# Patient Record
Sex: FEMALE | Race: WHITE | NOT HISPANIC OR LATINO | Employment: OTHER | URBAN - METROPOLITAN AREA
[De-identification: names, ages, dates, MRNs, and addresses within clinical notes are randomized per-mention and may not be internally consistent; named-entity substitution may affect disease eponyms.]

---

## 2023-01-01 ENCOUNTER — NURSING HOME VISIT (OUTPATIENT)
Dept: POST ACUTE CARE | Facility: EXTERNAL LOCATION | Age: 63
End: 2023-01-01
Payer: MEDICARE

## 2023-01-01 ENCOUNTER — PATIENT OUTREACH (OUTPATIENT)
Dept: PRIMARY CARE | Facility: CLINIC | Age: 63
End: 2023-01-01
Payer: MEDICARE

## 2023-01-01 ENCOUNTER — APPOINTMENT (OUTPATIENT)
Dept: PRIMARY CARE | Facility: CLINIC | Age: 63
End: 2023-01-01
Payer: MEDICARE

## 2023-01-01 ENCOUNTER — OFFICE VISIT (OUTPATIENT)
Dept: PRIMARY CARE | Facility: CLINIC | Age: 63
End: 2023-01-01
Payer: MEDICARE

## 2023-01-01 VITALS
SYSTOLIC BLOOD PRESSURE: 131 MMHG | OXYGEN SATURATION: 96 % | TEMPERATURE: 97 F | WEIGHT: 206 LBS | HEART RATE: 71 BPM | BODY MASS INDEX: 36.49 KG/M2 | DIASTOLIC BLOOD PRESSURE: 82 MMHG | RESPIRATION RATE: 16 BRPM

## 2023-01-01 VITALS
DIASTOLIC BLOOD PRESSURE: 74 MMHG | HEART RATE: 84 BPM | SYSTOLIC BLOOD PRESSURE: 130 MMHG | OXYGEN SATURATION: 96 % | WEIGHT: 199 LBS | RESPIRATION RATE: 20 BRPM | BODY MASS INDEX: 35.25 KG/M2 | TEMPERATURE: 98 F

## 2023-01-01 VITALS — HEART RATE: 100 BPM | DIASTOLIC BLOOD PRESSURE: 72 MMHG | OXYGEN SATURATION: 95 % | SYSTOLIC BLOOD PRESSURE: 111 MMHG

## 2023-01-01 VITALS
WEIGHT: 200 LBS | DIASTOLIC BLOOD PRESSURE: 81 MMHG | OXYGEN SATURATION: 94 % | BODY MASS INDEX: 35.43 KG/M2 | TEMPERATURE: 98.2 F | RESPIRATION RATE: 19 BRPM | HEART RATE: 70 BPM | SYSTOLIC BLOOD PRESSURE: 128 MMHG

## 2023-01-01 VITALS
WEIGHT: 207 LBS | HEART RATE: 91 BPM | DIASTOLIC BLOOD PRESSURE: 76 MMHG | SYSTOLIC BLOOD PRESSURE: 138 MMHG | OXYGEN SATURATION: 96 % | RESPIRATION RATE: 16 BRPM | TEMPERATURE: 98.6 F | BODY MASS INDEX: 36.67 KG/M2

## 2023-01-01 VITALS
TEMPERATURE: 98.2 F | SYSTOLIC BLOOD PRESSURE: 130 MMHG | DIASTOLIC BLOOD PRESSURE: 81 MMHG | WEIGHT: 206 LBS | OXYGEN SATURATION: 96 % | HEART RATE: 89 BPM | RESPIRATION RATE: 20 BRPM | BODY MASS INDEX: 36.49 KG/M2

## 2023-01-01 VITALS
RESPIRATION RATE: 16 BRPM | OXYGEN SATURATION: 96 % | BODY MASS INDEX: 33.13 KG/M2 | DIASTOLIC BLOOD PRESSURE: 74 MMHG | HEART RATE: 76 BPM | WEIGHT: 187 LBS | SYSTOLIC BLOOD PRESSURE: 120 MMHG | TEMPERATURE: 98.3 F

## 2023-01-01 VITALS
BODY MASS INDEX: 35.61 KG/M2 | WEIGHT: 201 LBS | RESPIRATION RATE: 19 BRPM | TEMPERATURE: 98 F | SYSTOLIC BLOOD PRESSURE: 130 MMHG | OXYGEN SATURATION: 96 % | DIASTOLIC BLOOD PRESSURE: 72 MMHG | HEART RATE: 90 BPM

## 2023-01-01 DIAGNOSIS — R53.81 PHYSICAL DECONDITIONING: ICD-10-CM

## 2023-01-01 DIAGNOSIS — I50.9 HEART FAILURE, UNSPECIFIED HF CHRONICITY, UNSPECIFIED HEART FAILURE TYPE (MULTI): Primary | ICD-10-CM

## 2023-01-01 DIAGNOSIS — E11.65 TYPE 2 DIABETES MELLITUS WITH HYPERGLYCEMIA, WITH LONG-TERM CURRENT USE OF INSULIN (MULTI): Primary | ICD-10-CM

## 2023-01-01 DIAGNOSIS — L30.4 INTERTRIGO: ICD-10-CM

## 2023-01-01 DIAGNOSIS — G89.4 CHRONIC PAIN SYNDROME: ICD-10-CM

## 2023-01-01 DIAGNOSIS — G47.33 OSA (OBSTRUCTIVE SLEEP APNEA): ICD-10-CM

## 2023-01-01 DIAGNOSIS — K21.9 GASTROESOPHAGEAL REFLUX DISEASE, UNSPECIFIED WHETHER ESOPHAGITIS PRESENT: ICD-10-CM

## 2023-01-01 DIAGNOSIS — J44.9 CHRONIC OBSTRUCTIVE PULMONARY DISEASE, UNSPECIFIED COPD TYPE (MULTI): ICD-10-CM

## 2023-01-01 DIAGNOSIS — E87.6 HYPOKALEMIA: ICD-10-CM

## 2023-01-01 DIAGNOSIS — D22.9 SKIN MOLE: ICD-10-CM

## 2023-01-01 DIAGNOSIS — D22.9 MULTIPLE NEVI: ICD-10-CM

## 2023-01-01 DIAGNOSIS — D80.3 IGG DEFICIENCY (MULTI): ICD-10-CM

## 2023-01-01 DIAGNOSIS — E11.65 TYPE 2 DIABETES MELLITUS WITH HYPERGLYCEMIA, WITH LONG-TERM CURRENT USE OF INSULIN (MULTI): ICD-10-CM

## 2023-01-01 DIAGNOSIS — D50.9 IRON DEFICIENCY ANEMIA, UNSPECIFIED IRON DEFICIENCY ANEMIA TYPE: ICD-10-CM

## 2023-01-01 DIAGNOSIS — Z00.00 HEALTH CARE MAINTENANCE: ICD-10-CM

## 2023-01-01 DIAGNOSIS — F33.9 RECURRENT MAJOR DEPRESSIVE DISORDER, REMISSION STATUS UNSPECIFIED (CMS-HCC): ICD-10-CM

## 2023-01-01 DIAGNOSIS — J18.9 RECURRENT PNEUMONIA: ICD-10-CM

## 2023-01-01 DIAGNOSIS — F41.9 ANXIETY SYNDROME: ICD-10-CM

## 2023-01-01 DIAGNOSIS — D47.3 ESSENTIAL THROMBOCYTHEMIA (MULTI): ICD-10-CM

## 2023-01-01 DIAGNOSIS — F32.A DEPRESSIVE DISORDER: ICD-10-CM

## 2023-01-01 DIAGNOSIS — I82.622 DEEP VEIN THROMBOSIS (DVT) OF LEFT UPPER EXTREMITY, UNSPECIFIED CHRONICITY, UNSPECIFIED VEIN (MULTI): ICD-10-CM

## 2023-01-01 DIAGNOSIS — Z79.4 TYPE 2 DIABETES MELLITUS WITH HYPERGLYCEMIA, WITH LONG-TERM CURRENT USE OF INSULIN (MULTI): Primary | ICD-10-CM

## 2023-01-01 DIAGNOSIS — K59.00 CONSTIPATION, UNSPECIFIED CONSTIPATION TYPE: ICD-10-CM

## 2023-01-01 DIAGNOSIS — I50.9 HEART FAILURE, UNSPECIFIED HF CHRONICITY, UNSPECIFIED HEART FAILURE TYPE (MULTI): ICD-10-CM

## 2023-01-01 DIAGNOSIS — E55.9 VITAMIN D DEFICIENCY: ICD-10-CM

## 2023-01-01 DIAGNOSIS — E66.01 CLASS 2 SEVERE OBESITY WITH SERIOUS COMORBIDITY AND BODY MASS INDEX (BMI) OF 35.0 TO 35.9 IN ADULT, UNSPECIFIED OBESITY TYPE (MULTI): ICD-10-CM

## 2023-01-01 DIAGNOSIS — J18.9 PNEUMONIA DUE TO INFECTIOUS ORGANISM, UNSPECIFIED LATERALITY, UNSPECIFIED PART OF LUNG: ICD-10-CM

## 2023-01-01 DIAGNOSIS — R05.9 COUGH, UNSPECIFIED TYPE: ICD-10-CM

## 2023-01-01 DIAGNOSIS — Z79.4 TYPE 2 DIABETES MELLITUS WITH HYPERGLYCEMIA, WITH LONG-TERM CURRENT USE OF INSULIN (MULTI): ICD-10-CM

## 2023-01-01 DIAGNOSIS — Z00.00 ENCOUNTER FOR GENERAL ADULT MEDICAL EXAMINATION WITHOUT ABNORMAL FINDINGS: ICD-10-CM

## 2023-01-01 DIAGNOSIS — F41.9 ANXIETY DISORDER, UNSPECIFIED: ICD-10-CM

## 2023-01-01 PROCEDURE — 99308 SBSQ NF CARE LOW MDM 20: CPT | Performed by: NURSE PRACTITIONER

## 2023-01-01 PROCEDURE — 3074F SYST BP LT 130 MM HG: CPT | Performed by: INTERNAL MEDICINE

## 2023-01-01 PROCEDURE — 99309 SBSQ NF CARE MODERATE MDM 30: CPT | Performed by: NURSE PRACTITIONER

## 2023-01-01 PROCEDURE — 3078F DIAST BP <80 MM HG: CPT | Performed by: INTERNAL MEDICINE

## 2023-01-01 PROCEDURE — 99214 OFFICE O/P EST MOD 30 MIN: CPT | Performed by: INTERNAL MEDICINE

## 2023-01-01 PROCEDURE — 3066F NEPHROPATHY DOC TX: CPT | Performed by: INTERNAL MEDICINE

## 2023-01-01 PROCEDURE — 3051F HG A1C>EQUAL 7.0%<8.0%: CPT | Performed by: INTERNAL MEDICINE

## 2023-01-01 PROCEDURE — 3008F BODY MASS INDEX DOCD: CPT | Performed by: INTERNAL MEDICINE

## 2023-01-01 PROCEDURE — 1036F TOBACCO NON-USER: CPT | Performed by: INTERNAL MEDICINE

## 2023-01-01 RX ORDER — FLUTICASONE FUROATE, UMECLIDINIUM BROMIDE AND VILANTEROL TRIFENATATE 200; 62.5; 25 UG/1; UG/1; UG/1
1 POWDER RESPIRATORY (INHALATION) DAILY
COMMUNITY

## 2023-01-01 RX ORDER — PRIMIDONE 50 MG/1
50 TABLET ORAL NIGHTLY
COMMUNITY
End: 2023-01-01

## 2023-01-01 RX ORDER — HYDROCODONE BITARTRATE AND ACETAMINOPHEN 5; 325 MG/1; MG/1
1 TABLET ORAL EVERY 6 HOURS PRN
COMMUNITY

## 2023-01-01 RX ORDER — FLUTICASONE FUROATE, UMECLIDINIUM BROMIDE AND VILANTEROL TRIFENATATE 100; 62.5; 25 UG/1; UG/1; UG/1
1 POWDER RESPIRATORY (INHALATION) DAILY
COMMUNITY
End: 2023-01-01 | Stop reason: ALTCHOICE

## 2023-01-01 RX ORDER — BUSPIRONE HYDROCHLORIDE 15 MG/1
1 TABLET ORAL 3 TIMES DAILY
COMMUNITY
Start: 2021-11-09

## 2023-01-01 RX ORDER — LANCETS
EACH MISCELLANEOUS DAILY
COMMUNITY

## 2023-01-01 RX ORDER — PANTOPRAZOLE SODIUM 40 MG/1
1 TABLET, DELAYED RELEASE ORAL DAILY
COMMUNITY
Start: 2021-08-06 | End: 2023-01-01

## 2023-01-01 RX ORDER — LANCETS 26 GAUGE
1 EACH MISCELLANEOUS DAILY
COMMUNITY

## 2023-01-01 RX ORDER — DOCUSATE SODIUM 100 MG/1
1 CAPSULE, LIQUID FILLED ORAL DAILY
COMMUNITY
Start: 2022-03-23

## 2023-01-01 RX ORDER — PREDNISONE 20 MG/1
20 TABLET ORAL DAILY
COMMUNITY
End: 2023-01-01 | Stop reason: SDUPTHER

## 2023-01-01 RX ORDER — HYDROXYZINE HYDROCHLORIDE 25 MG/1
50 TABLET, FILM COATED ORAL EVERY 8 HOURS PRN
Qty: 90 TABLET | Refills: 0 | Status: SHIPPED | OUTPATIENT
Start: 2023-01-01 | End: 2023-10-04

## 2023-01-01 RX ORDER — ALBUTEROL SULFATE 0.83 MG/ML
1 SOLUTION RESPIRATORY (INHALATION) EVERY 4 HOURS PRN
COMMUNITY
Start: 2021-08-18

## 2023-01-01 RX ORDER — IPRATROPIUM BROMIDE AND ALBUTEROL SULFATE 2.5; .5 MG/3ML; MG/3ML
3 SOLUTION RESPIRATORY (INHALATION)
COMMUNITY
Start: 2021-08-18

## 2023-01-01 RX ORDER — ROFLUMILAST 250 UG/1
250 TABLET ORAL EVERY OTHER DAY
COMMUNITY
Start: 2021-08-18

## 2023-01-01 RX ORDER — PREDNISONE 10 MG/1
TABLET ORAL
Qty: 90 TABLET | Refills: 3 | Status: SHIPPED | OUTPATIENT
Start: 2023-01-01 | End: 2024-06-06

## 2023-01-01 RX ORDER — HYDROXYZINE HYDROCHLORIDE 25 MG/1
25 TABLET, FILM COATED ORAL EVERY 8 HOURS PRN
COMMUNITY
End: 2023-01-01 | Stop reason: SDUPTHER

## 2023-01-01 RX ORDER — FERROUS GLUCONATE 324(38)MG
1 TABLET ORAL
COMMUNITY
Start: 2022-03-21

## 2023-01-01 RX ORDER — GUAIFENESIN 600 MG/1
600 TABLET, EXTENDED RELEASE ORAL 2 TIMES DAILY
COMMUNITY
Start: 2021-08-18

## 2023-01-01 RX ORDER — BLOOD SUGAR DIAGNOSTIC
STRIP MISCELLANEOUS DAILY
COMMUNITY
End: 2023-01-01

## 2023-01-01 RX ORDER — ONDANSETRON 4 MG/1
4 TABLET, FILM COATED ORAL EVERY 8 HOURS PRN
COMMUNITY
Start: 2021-10-12

## 2023-01-01 RX ORDER — HYDROXYZINE HYDROCHLORIDE 25 MG/1
50 TABLET, FILM COATED ORAL EVERY 8 HOURS PRN
Qty: 90 TABLET | Refills: 1 | Status: SHIPPED | OUTPATIENT
Start: 2023-01-01 | End: 2023-01-01

## 2023-01-01 RX ORDER — PREDNISOLONE 5 MG/1
5 TABLET ORAL DAILY
COMMUNITY
End: 2023-01-01 | Stop reason: DRUGHIGH

## 2023-01-01 RX ORDER — FUROSEMIDE 40 MG/1
1 TABLET ORAL DAILY
COMMUNITY
Start: 2021-08-18 | End: 2023-01-01

## 2023-01-01 RX ORDER — QUETIAPINE FUMARATE 25 MG/1
TABLET, FILM COATED ORAL 3 TIMES DAILY
COMMUNITY
End: 2023-01-01

## 2023-01-01 RX ORDER — GLIPIZIDE 5 MG/1
1 TABLET ORAL 2 TIMES DAILY
COMMUNITY
Start: 2021-08-18 | End: 2023-01-01

## 2023-01-01 RX ORDER — DEXTROSE 4 G
TABLET,CHEWABLE ORAL DAILY
COMMUNITY

## 2023-01-01 RX ORDER — FUROSEMIDE 20 MG/1
20 TABLET ORAL DAILY
COMMUNITY
End: 2023-01-01

## 2023-01-01 RX ORDER — NYSTATIN 100000 [USP'U]/G
1 POWDER TOPICAL
COMMUNITY

## 2023-01-01 RX ORDER — CALCITONIN SALMON 200 [IU]/.09ML
1 SPRAY, METERED NASAL DAILY
COMMUNITY
Start: 2021-08-18

## 2023-01-01 RX ORDER — MIRTAZAPINE 15 MG/1
1 TABLET, FILM COATED ORAL DAILY
COMMUNITY
Start: 2021-08-18 | End: 2023-01-01

## 2023-01-01 RX ORDER — NALOXONE HYDROCHLORIDE 0.4 MG/ML
INJECTION, SOLUTION INTRAMUSCULAR; INTRAVENOUS; SUBCUTANEOUS
COMMUNITY
Start: 2021-10-12

## 2023-01-01 RX ORDER — FERROUS GLUCONATE 324(38)MG
1 TABLET ORAL 2 TIMES DAILY
Qty: 180 TABLET | Refills: 0 | Status: SHIPPED | OUTPATIENT
Start: 2023-01-01 | End: 2023-01-01

## 2023-01-01 RX ORDER — ASCORBIC ACID 500 MG
500 TABLET ORAL DAILY
COMMUNITY

## 2023-01-01 RX ORDER — POTASSIUM CHLORIDE 750 MG/1
1 TABLET, FILM COATED, EXTENDED RELEASE ORAL DAILY
COMMUNITY
Start: 2022-08-26 | End: 2023-01-01 | Stop reason: ALTCHOICE

## 2023-01-01 RX ORDER — QUETIAPINE FUMARATE 25 MG/1
TABLET, FILM COATED ORAL
Qty: 60 TABLET | Refills: 0 | Status: SHIPPED | OUTPATIENT
Start: 2023-01-01 | End: 2023-01-01

## 2023-01-01 RX ORDER — ACETAMINOPHEN 500 MG
1 TABLET ORAL DAILY
COMMUNITY
Start: 2022-07-12 | End: 2023-01-01 | Stop reason: ALTCHOICE

## 2023-01-01 RX ORDER — POLYETHYLENE GLYCOL 3350 17 G/17G
17 POWDER, FOR SOLUTION ORAL DAILY
COMMUNITY

## 2023-01-01 RX ORDER — ESCITALOPRAM OXALATE 10 MG/1
1 TABLET ORAL DAILY
COMMUNITY
Start: 2021-03-08

## 2023-01-01 RX ORDER — POTASSIUM CHLORIDE 20 MEQ/1
20 TABLET, EXTENDED RELEASE ORAL DAILY
COMMUNITY

## 2023-01-01 RX ORDER — QUETIAPINE FUMARATE 25 MG/1
25 TABLET, FILM COATED ORAL NIGHTLY
COMMUNITY

## 2023-01-01 ASSESSMENT — ENCOUNTER SYMPTOMS
WHEEZING: 1
SHORTNESS OF BREATH: 1
COUGH: 1
WEAKNESS: 1
NERVOUS/ANXIOUS: 1
FATIGUE: 1

## 2023-01-01 ASSESSMENT — PAIN SCALES - GENERAL: PAINLEVEL: 0-NO PAIN

## 2023-03-04 PROBLEM — R21 RASH: Status: ACTIVE | Noted: 2023-01-01

## 2023-03-04 PROBLEM — G89.29 NECK PAIN, CHRONIC: Status: ACTIVE | Noted: 2023-01-01

## 2023-03-04 PROBLEM — R60.9 EDEMA: Status: ACTIVE | Noted: 2023-01-01

## 2023-03-04 PROBLEM — R52 GENERALIZED PAIN: Status: ACTIVE | Noted: 2023-01-01

## 2023-03-04 PROBLEM — I50.9 ACUTE ON CHRONIC CONGESTIVE HEART FAILURE (MULTI): Status: ACTIVE | Noted: 2023-01-01

## 2023-03-04 PROBLEM — J96.11 CHRONIC RESPIRATORY FAILURE WITH HYPOXIA (MULTI): Status: ACTIVE | Noted: 2023-01-01

## 2023-03-04 PROBLEM — F32.A DEPRESSIVE DISORDER: Status: ACTIVE | Noted: 2023-01-01

## 2023-03-04 PROBLEM — M79.672 BILATERAL LEG AND FOOT PAIN: Status: ACTIVE | Noted: 2023-01-01

## 2023-03-04 PROBLEM — M19.90 DJD (DEGENERATIVE JOINT DISEASE): Status: ACTIVE | Noted: 2023-01-01

## 2023-03-04 PROBLEM — M79.605 BILATERAL LEG AND FOOT PAIN: Status: ACTIVE | Noted: 2023-01-01

## 2023-03-04 PROBLEM — E66.01 CLASS 2 SEVERE OBESITY WITH SERIOUS COMORBIDITY AND BODY MASS INDEX (BMI) OF 35.0 TO 35.9 IN ADULT (MULTI): Status: ACTIVE | Noted: 2023-01-01

## 2023-03-04 PROBLEM — G89.4 CHRONIC PAIN SYNDROME: Status: ACTIVE | Noted: 2023-01-01

## 2023-03-04 PROBLEM — J44.1 COPD EXACERBATION (MULTI): Status: ACTIVE | Noted: 2023-01-01

## 2023-03-04 PROBLEM — D22.9 MULTIPLE NEVI: Status: ACTIVE | Noted: 2023-01-01

## 2023-03-04 PROBLEM — B59: Status: ACTIVE | Noted: 2023-01-01

## 2023-03-04 PROBLEM — I25.10 CAD (CORONARY ATHEROSCLEROTIC DISEASE): Status: ACTIVE | Noted: 2023-01-01

## 2023-03-04 PROBLEM — G47.33 OSA (OBSTRUCTIVE SLEEP APNEA): Status: ACTIVE | Noted: 2023-01-01

## 2023-03-04 PROBLEM — F11.90 CHRONIC NARCOTIC USE: Status: ACTIVE | Noted: 2023-01-01

## 2023-03-04 PROBLEM — M43.06 LUMBAR SPONDYLOLYSIS: Status: ACTIVE | Noted: 2023-01-01

## 2023-03-04 PROBLEM — R07.9 CHEST PAIN: Status: ACTIVE | Noted: 2023-01-01

## 2023-03-04 PROBLEM — R11.0 NAUSEA IN ADULT: Status: ACTIVE | Noted: 2023-01-01

## 2023-03-04 PROBLEM — J44.9 COPD (CHRONIC OBSTRUCTIVE PULMONARY DISEASE) (MULTI): Status: ACTIVE | Noted: 2023-01-01

## 2023-03-04 PROBLEM — D47.3 ESSENTIAL THROMBOCYTHEMIA (MULTI): Status: ACTIVE | Noted: 2023-01-01

## 2023-03-04 PROBLEM — J18.9 PNEUMONIA: Status: ACTIVE | Noted: 2023-01-01

## 2023-03-04 PROBLEM — F41.9 ANXIETY SYNDROME: Status: ACTIVE | Noted: 2023-01-01

## 2023-03-04 PROBLEM — E66.9 CLASS 2 OBESITY WITH BODY MASS INDEX (BMI) OF 37.0 TO 37.9 IN ADULT: Status: ACTIVE | Noted: 2023-01-01

## 2023-03-04 PROBLEM — R53.1 WEAKNESS: Status: ACTIVE | Noted: 2023-01-01

## 2023-03-04 PROBLEM — R91.1 LUNG NODULE, SOLITARY: Status: ACTIVE | Noted: 2023-01-01

## 2023-03-04 PROBLEM — R05.9 COUGH: Status: ACTIVE | Noted: 2023-01-01

## 2023-03-04 PROBLEM — R30.0 DYSURIA: Status: ACTIVE | Noted: 2023-01-01

## 2023-03-04 PROBLEM — M79.671 BILATERAL LEG AND FOOT PAIN: Status: ACTIVE | Noted: 2023-01-01

## 2023-03-04 PROBLEM — R06.00 DYSPNEA: Status: ACTIVE | Noted: 2023-01-01

## 2023-03-04 PROBLEM — M43.9 COMPRESSION DEFORMITY OF VERTEBRA: Status: ACTIVE | Noted: 2023-01-01

## 2023-03-04 PROBLEM — I21.4 NSTEMI, INITIAL EPISODE OF CARE (MULTI): Status: ACTIVE | Noted: 2023-01-01

## 2023-03-04 PROBLEM — R63.0 POOR APPETITE: Status: ACTIVE | Noted: 2023-01-01

## 2023-03-04 PROBLEM — D22.9 SKIN MOLE: Status: ACTIVE | Noted: 2023-01-01

## 2023-03-04 PROBLEM — F32.9 MDD (MAJOR DEPRESSIVE DISORDER): Status: ACTIVE | Noted: 2023-01-01

## 2023-03-04 PROBLEM — D75.839 THROMBOCYTOSIS: Status: ACTIVE | Noted: 2023-01-01

## 2023-03-04 PROBLEM — F17.210 CIGARETTE NICOTINE DEPENDENCE WITHOUT COMPLICATION: Status: ACTIVE | Noted: 2023-01-01

## 2023-03-04 PROBLEM — M54.2 NECK PAIN, CHRONIC: Status: ACTIVE | Noted: 2023-01-01

## 2023-03-04 PROBLEM — K59.00 CONSTIPATION: Status: ACTIVE | Noted: 2023-01-01

## 2023-03-04 PROBLEM — B37.0 ORAL THRUSH: Status: ACTIVE | Noted: 2023-01-01

## 2023-03-04 PROBLEM — K21.9 GERD (GASTROESOPHAGEAL REFLUX DISEASE): Status: ACTIVE | Noted: 2023-01-01

## 2023-03-04 PROBLEM — E11.9 DM2 (DIABETES MELLITUS, TYPE 2) (MULTI): Status: ACTIVE | Noted: 2023-01-01

## 2023-03-04 PROBLEM — J96.20: Status: ACTIVE | Noted: 2023-01-01

## 2023-03-04 PROBLEM — M79.604 BILATERAL LEG AND FOOT PAIN: Status: ACTIVE | Noted: 2023-01-01

## 2023-05-25 NOTE — PROGRESS NOTES
Discharge Facility: [facility]   Discharge Diagnosis:  Final Discharge Diagnoses: Stage 3 chronic kidney disease, COPD exacerbation, Acute on Chronic Diastolic Heart Failure     Admission Date 5-17-23  Discharge Date: 5-24-23    PCP Appointment Date: 6-2-23  Specialist Appointment Date: Pt. To follow up with Dr. Sellers in 2 weeks.   Hospital Encounter and Summary: Linked   See discharge assessment below for further details  Engagement  Call Start Time: 1044 (5/25/2023 10:46 AM)    Medications  Medications reviewed with patient/caregiver?: Yes (5/25/2023 10:46 AM)  Is the patient having any side effects they believe may be caused by any medication additions or changes?: No (5/25/2023 10:46 AM)  Does the patient have all medications ordered at discharge?: Yes (5/25/2023 10:46 AM)  Care Management Interventions: No intervention needed (5/25/2023 10:46 AM)  Is the patient taking all medications as directed (includes completed medication regime)?: Yes (5/25/2023 10:46 AM)  Care Management Interventions: Provided patient education (5/25/2023 10:46 AM)    Appointments  Does the patient have a primary care provider?: Yes (5/25/2023 10:46 AM)  Care Management Interventions: Verified appointment date/time/provider (5/25/2023 10:46 AM)  Has the patient kept scheduled appointments due by today?: Yes (5/25/2023 10:46 AM)  Care Management Interventions: Advised patient to keep appointment (5/25/2023 10:46 AM)    Self Management  What is the home health agency?: Home Care Certification:           Home Care Agency:   Other (with phone number)   [facility] - [phone]          Skilled Disciplines Ordered:   RN/LPN,  PT,  Home Health Aide    Home Care Services:           Home Care Skilled Service:   assessment,  CHF carepath,  COPD carepath,  Rehab (PT/OT/SP eval and treat) (5/25/2023 10:46 AM)  Has home health visited the patient within 72 hours of discharge?: Yes (5/25/2023 10:46 AM)    Patient Teaching  Does the  patient have access to their discharge instructions?: Yes (5/25/2023 10:46 AM)  Care Management Interventions: Reviewed instructions with patient (5/25/2023 10:46 AM)  What is the patient's perception of their health status since discharge?: Same (5/25/2023 10:46 AM)  Is the patient/caregiver able to teach back the hierarchy of who to call/visit for symptoms/problems? PCP, Specialist, Home Health nurse, Urgent Care, ED, 911: Yes (5/25/2023 10:46 AM)      Diamond Jiang LPN

## 2023-06-13 NOTE — PROGRESS NOTES
This  nurse did TCM Call with pt. Requesting for ambulance to be sent to her home for services. Pt. States her ankles are in a great deal of pain, and she feels more weak than she did yesterday. This nurse called the [facility] on the [place] fire de[facility]. To request services to her home.

## 2023-07-25 NOTE — LETTER
Patient: Ruth Escobedo  : 1960    Encounter Date: 2023    Patient ID: Ruth Escobedo is a 62 y.o. female who is acute skilled care being seen and evaluated for multiple medical problems.  [id number]   1960    /81   Pulse 89   Temp 36.8 °C (98.2 °F)   Resp 20   Wt 93.4 kg (206 lb)   SpO2 96%   BMI 36.49 kg/m²     Assessment/Plan  Problem List Items Addressed This Visit       Heart failure (CMS/[facility])     Increase Lasix to 60 mg by mouth daily x 5 days then decrease to 40 mg by mouth daily         Anxiety syndrome     Vistaril  25 mg by mouth BID as needed   Buspirone 15 mg by mouth TID         COPD (chronic obstructive pulmonary disease) (CMS/[facility])     O2 4 lit NC  Prednisone  taper as prescribed   Duoneb every 4 hrs and every two hrs as needed   Budesonide 0.5 ml inhalation BID   Trelegy 100-62.5- 25 mcg one inhalation daily   Proventil 90 mcg two inhalations every 4 hrs as needed   Roflumilast 250 mcg by mouth daily   Acetylcysteine 20% inhalation every 6 hrs (Stop date 2023)   Dr Sellers (pulmonology)              Depressive disorder     Lexapro 10 mg by mouth daily   Mirtazapine 15 mg by mouth daily          DM2 (diabetes mellitus, type 2) (CMS/[facility]) - Primary     Lantus 30 units subcutaneous daily   Humalog 8 units subcutaneous TID with meals  Humalog SSC as prescribed   Glipizide 5 mg by mouth daily          GERD (gastroesophageal reflux disease)     Protonix 40 mg by mouth daily          RESOLVED: Multiple nevi    Constipation     MiraLax 17 grams by mouth daily as needed  Docusate 100 mg by mouth daily as needed          Chronic pain syndrome     Norco 7.5/325 mg by mouth every 8 hrs as needed          MEERA (obstructive sleep apnea)     BIPAP         Recurrent pneumonia     Cefepime 2 grams IV every 8 hrs (Stop date 2023)          RESOLVED: Skin mole    RESOLVED: Class 2 severe obesity with serious comorbidity and body mass index (BMI) of 35.0 to 35.9 in adult (CMS/[facility])     Hypokalemia     Potassium 30 meq by mouth daily          Cough     Benzonatate 200 mg by mouth TID          Iron deficiency anemia     Ferrous gluconate 324 mg by mouth daily          Vitamin D deficiency     D3 50 mcg by mouth daily          Physical deconditioning     PT/OT         Intertrigo     Nystatin 100, 000 units/gram apply to abdominal folds BID              HPI: Client first admitted at [facility] from 7/9/2203- 7/15/2023 with the final diagnosis of Acute on Chronic respiratory failure. She received Levofloxacin and Unasyn for pneumonia. Then client admitted at [facility] from 7/16/2023- 7/24/2023 with the final diagnosis of Recurrent pneumonia and exacerbation of COPD. Sputum culture positive for Acinetobacter. Client discharged to this facility with IV Cefepime and tapering prednisone. Client denies HA, dizziness, or lightheadedness. Denies CP. C/O harsh cough and exertional dyspnea. C/O increasing BLE edema. O2 4 lit NC intact. Denies abdominal pain, N/V, diarrhea, or constipation. Denies dysuria. States appetite decreased. C/O chronic pain, more so BLE and right shoulder.       Review of Systems ROS as described in in HPI     Physical Exam  Constitutional:       Comments: Sitting upright in bed    HENT:      Mouth/Throat:      Mouth: Mucous membranes are moist.   Cardiovascular:      Rate and Rhythm: Normal rate.   Pulmonary:      Comments: Diminished breath sounds  O2 4 lit   Exertional dyspnea     Abdominal:      General: Bowel sounds are normal.   Genitourinary:     Comments: Denies dysuria   Musculoskeletal:      Cervical back: Neck supple.      Right lower leg: Edema (plus two) present.      Left lower leg: Edema (plus two) present.      Comments: WC transfers   Exertional dyspnea   PT/OT   Skin:     General: Skin is warm and dry.      Comments: Ecchymosis to \[facility]\""   Neurological:      Mental Status: She is alert. Mental status is at baseline.   Psychiatric:      Comments: Episodes of  heightened anxiety                    Electronically Signed By: SONIDO Rhodes-CNP   7/26/23  7:10 PM

## 2023-07-26 PROBLEM — E66.9 CLASS 2 OBESITY WITH BODY MASS INDEX (BMI) OF 37.0 TO 37.9 IN ADULT: Status: RESOLVED | Noted: 2023-01-01 | Resolved: 2023-01-01

## 2023-07-26 PROBLEM — J44.1 COPD EXACERBATION (MULTI): Status: RESOLVED | Noted: 2023-01-01 | Resolved: 2023-01-01

## 2023-07-26 PROBLEM — G57.90 NEUROPATHY OF LOWER EXTREMITY: Status: ACTIVE | Noted: 2023-01-01

## 2023-07-26 PROBLEM — R05.9 COUGH: Status: ACTIVE | Noted: 2023-01-01

## 2023-07-26 PROBLEM — E11.65 TYPE 2 DIABETES MELLITUS WITH HYPERGLYCEMIA (MULTI): Status: RESOLVED | Noted: 2023-01-01 | Resolved: 2023-01-01

## 2023-07-26 PROBLEM — R11.0 NAUSEA IN ADULT: Status: RESOLVED | Noted: 2023-01-01 | Resolved: 2023-01-01

## 2023-07-26 PROBLEM — R05.9 COUGH: Status: RESOLVED | Noted: 2023-01-01 | Resolved: 2023-01-01

## 2023-07-26 PROBLEM — I42.9 CARDIOMYOPATHY (MULTI): Status: ACTIVE | Noted: 2023-01-01

## 2023-07-26 PROBLEM — R21 RASH: Status: RESOLVED | Noted: 2023-01-01 | Resolved: 2023-01-01

## 2023-07-26 PROBLEM — L30.4 INTERTRIGO: Status: ACTIVE | Noted: 2023-01-01

## 2023-07-26 PROBLEM — R52 GENERALIZED PAIN: Status: RESOLVED | Noted: 2023-01-01 | Resolved: 2023-01-01

## 2023-07-26 PROBLEM — E83.39 HYPOPHOSPHATEMIA: Status: ACTIVE | Noted: 2023-01-01

## 2023-07-26 PROBLEM — E87.6 HYPOKALEMIA: Status: ACTIVE | Noted: 2023-01-01

## 2023-07-26 PROBLEM — I82.409 DVT (DEEP VENOUS THROMBOSIS) (MULTI): Status: ACTIVE | Noted: 2023-01-01

## 2023-07-26 PROBLEM — E66.01 CLASS 2 SEVERE OBESITY WITH SERIOUS COMORBIDITY AND BODY MASS INDEX (BMI) OF 35.0 TO 35.9 IN ADULT (MULTI): Status: RESOLVED | Noted: 2023-01-01 | Resolved: 2023-01-01

## 2023-07-26 PROBLEM — R53.81 PHYSICAL DECONDITIONING: Status: ACTIVE | Noted: 2023-01-01

## 2023-07-26 PROBLEM — R06.00 DYSPNEA: Status: RESOLVED | Noted: 2023-01-01 | Resolved: 2023-01-01

## 2023-07-26 PROBLEM — R07.9 CHEST PAIN: Status: RESOLVED | Noted: 2023-01-01 | Resolved: 2023-01-01

## 2023-07-26 PROBLEM — E11.65 TYPE 2 DIABETES MELLITUS WITH HYPERGLYCEMIA (MULTI): Status: ACTIVE | Noted: 2023-01-01

## 2023-07-26 PROBLEM — D50.9 IRON DEFICIENCY ANEMIA: Status: ACTIVE | Noted: 2023-01-01

## 2023-07-26 PROBLEM — D22.9 SKIN MOLE: Status: RESOLVED | Noted: 2023-01-01 | Resolved: 2023-01-01

## 2023-07-26 PROBLEM — R60.9 EDEMA: Status: RESOLVED | Noted: 2023-01-01 | Resolved: 2023-01-01

## 2023-07-26 PROBLEM — R63.0 POOR APPETITE: Status: RESOLVED | Noted: 2023-01-01 | Resolved: 2023-01-01

## 2023-07-26 PROBLEM — B37.0 ORAL THRUSH: Status: RESOLVED | Noted: 2023-01-01 | Resolved: 2023-01-01

## 2023-07-26 PROBLEM — E55.9 VITAMIN D DEFICIENCY: Status: ACTIVE | Noted: 2023-01-01

## 2023-07-26 PROBLEM — B59: Status: RESOLVED | Noted: 2023-01-01 | Resolved: 2023-01-01

## 2023-07-26 PROBLEM — R53.1 WEAKNESS: Status: RESOLVED | Noted: 2023-01-01 | Resolved: 2023-01-01

## 2023-07-26 PROBLEM — I50.9 ACUTE ON CHRONIC CONGESTIVE HEART FAILURE (MULTI): Status: RESOLVED | Noted: 2023-01-01 | Resolved: 2023-01-01

## 2023-07-26 PROBLEM — F32.9 MDD (MAJOR DEPRESSIVE DISORDER): Status: RESOLVED | Noted: 2023-01-01 | Resolved: 2023-01-01

## 2023-07-26 PROBLEM — R30.0 DYSURIA: Status: RESOLVED | Noted: 2023-01-01 | Resolved: 2023-01-01

## 2023-07-26 PROBLEM — D22.9 MULTIPLE NEVI: Status: RESOLVED | Noted: 2023-01-01 | Resolved: 2023-01-01

## 2023-07-26 NOTE — ASSESSMENT & PLAN NOTE
O2 4 lit NC  Prednisone  taper as prescribed   Duoneb every 4 hrs and every two hrs as needed   Budesonide 0.5 ml inhalation BID   Trelegy 100-62.5- 25 mcg one inhalation daily   Proventil 90 mcg two inhalations every 4 hrs as needed   Roflumilast 250 mcg by mouth daily   Acetylcysteine 20% inhalation every 6 hrs (Stop date 7/31/2023)   Dr Sellers (pulmonology)

## 2023-07-26 NOTE — ASSESSMENT & PLAN NOTE
Lantus 30 units subcutaneous daily   Humalog 8 units subcutaneous TID with meals  Humalog SSC as prescribed   Glipizide 5 mg by mouth daily

## 2023-07-26 NOTE — PROGRESS NOTES
Patient ID: Ruth Escobedo is a 62 y.o. female who is acute [facility] care being seen and evaluated for multiple medical problems.  [id number]   1960    /81   Pulse 89   Temp 36.8 °C (98.2 °F)   Resp 20   Wt 93.4 kg (206 lb)   SpO2 96%   BMI 36.49 kg/m²     Assessment/Plan  Problem List Items Addressed This Visit       Heart failure (CMS/[facility])     Increase Lasix to 60 mg by mouth daily x 5 days then decrease to 40 mg by mouth daily         Anxiety syndrome     Vistaril  25 mg by mouth BID as needed   Buspirone 15 mg by mouth TID         COPD (chronic obstructive pulmonary disease) (CMS/[facility])     O2 4 lit NC  Prednisone  taper as prescribed   Duoneb every 4 hrs and every two hrs as needed   Budesonide 0.5 ml inhalation BID   Trelegy 100-62.5- 25 mcg one inhalation daily   Proventil 90 mcg two inhalations every 4 hrs as needed   Roflumilast 250 mcg by mouth daily   Acetylcysteine 20% inhalation every 6 hrs (Stop date 7/31/2023)   Dr Sellers (pulmonology)              Depressive disorder     Lexapro 10 mg by mouth daily   Mirtazapine 15 mg by mouth daily          DM2 (diabetes mellitus, type 2) (CMS/[facility]) - Primary     Lantus 30 units subcutaneous daily   Humalog 8 units subcutaneous TID with meals  Humalog SSC as prescribed   Glipizide 5 mg by mouth daily          GERD (gastroesophageal reflux disease)     Protonix 40 mg by mouth daily          RESOLVED: Multiple nevi    Constipation     MiraLax 17 grams by mouth daily as needed  Docusate 100 mg by mouth daily as needed          Chronic pain syndrome     Norco 7.5/325 mg by mouth every 8 hrs as needed          MEERA (obstructive sleep apnea)     BIPAP         Recurrent pneumonia     Cefepime 2 grams IV every 8 hrs (Stop date 7/29/2023)          RESOLVED: Skin mole    RESOLVED: Class 2 severe obesity with serious comorbidity and body mass index (BMI) of 35.0 to 35.9 in adult (CMS/[facility])    Hypokalemia     Potassium 30 meq by mouth daily          Cough      Benzonatate 200 mg by mouth TID          Iron deficiency anemia     Ferrous gluconate 324 mg by mouth daily          Vitamin D deficiency     D3 50 mcg by mouth daily          Physical deconditioning     PT/OT         Intertrigo     Nystatin 100, 000 units/gram apply to abdominal folds BID              HPI: Client first admitted at [facility] from 7/9/2203- 7/15/2023 with the final diagnosis of Acute on Chronic respiratory failure. She received Levofloxacin and Unasyn for pneumonia. Then client admitted at [facility] from 7/16/2023- 7/24/2023 with the final diagnosis of Recurrent pneumonia and exacerbation of COPD. Sputum culture positive for Acinetobacter. Client discharged to this facility with IV Cefepime and tapering prednisone. Client denies HA, dizziness, or lightheadedness. Denies CP. C/O harsh cough and exertional dyspnea. C/O increasing BLE edema. O2 4 lit NC intact. Denies abdominal pain, N/V, diarrhea, or constipation. Denies dysuria. States appetite decreased. C/O chronic pain, more so BLE and right shoulder.       Review of Systems ROS as described in in HPI     Physical Exam  Constitutional:       Comments: Sitting upright in bed    HENT:      Mouth/Throat:      Mouth: Mucous membranes are moist.   Cardiovascular:      Rate and Rhythm: Normal rate.   Pulmonary:      Comments: Diminished breath sounds  O2 4 lit   Exertional dyspnea     Abdominal:      General: Bowel sounds are normal.   Genitourinary:     Comments: Denies dysuria   Musculoskeletal:      Cervical back: Neck supple.      Right lower leg: Edema (plus two) present.      Left lower leg: Edema (plus two) present.      Comments: WC transfers   Exertional dyspnea   PT/OT   Skin:     General: Skin is warm and dry.      Comments: Ecchymosis to [facility] PICC   Neurological:      Mental Status: She is alert. Mental status is at baseline.   Psychiatric:      Comments: Episodes of heightened anxiety

## 2023-08-01 NOTE — LETTER
Patient: Ruth Escobedo  : 1960    Encounter Date: 2023    Patient ID: Ruth Escobedo is a 62 y.o. female who is acute skilled care being seen and evaluated for multiple medical problems.  [id number]   1960    /82   Pulse 71   Temp 36.1 °C (97 °F)   Resp 16   Wt 93.4 kg (206 lb)   SpO2 96%   BMI 36.49 kg/m²     Assessment/Plan  Problem List Items Addressed This Visit       Heart failure (CMS/[facility]) - Primary     Lasix 20 mg  by mouth daily          Anxiety syndrome     Vistaril  25 mg by mouth BID as needed   Buspirone 15 mg by mouth TID         COPD (chronic obstructive pulmonary disease) (CMS/[facility])     O2 4 lit NC  Duoneb every 4 hrs and every two hrs as needed   Budesonide 0.5 ml inhalation BID   Trelegy 100-62.5- 25 mcg one inhalation daily   Proventil 90 mcg two inhalations every 4 hrs as needed   Roflumilast 250 mcg by mouth daily   Acetylcysteine 20% inhalation every 6 hrs (Stop date 2023)   Dr Sellers (pulmonology)             DM2 (diabetes mellitus, type 2) (CMS/[facility])     Lantus 30 units subcutaneous daily   Humalog 8 units subcutaneous TID with meals  Humalog SSC as prescribed   Glipizide 5 mg by mouth daily          GERD (gastroesophageal reflux disease)     Protonix 40 mg by mouth daily           Constipation     MiraLax 17 grams by mouth daily as needed  Docusate 100 mg by mouth daily as needed  MOM 30 ml by mouth daily as needed          Recurrent pneumonia     Cefepime 2 grams IV every 8 hrs completed 2023  DC PICC line              Hypokalemia     Potassium 30 meq by mouth daily    2023 K- 3.7         Vitamin D deficiency     D3 50 mcg by mouth daily           Physical deconditioning     PT/OT              HPI: Client continues to receive PT/OT services. Cefepime completed, afebrile. Client denies HA, dizziness, or lightheadedness. Denies CP, current SOB, or increased edema. O2 4 lit NC. Denies abdominal pain, N/V, diarrhea, or constipation. Denies dysuria. Denies  change in appetite. Client has chronic shoulder and lumbar pain.     Review of Systems ROS as described in in HPI     Physical Exam  Constitutional:       Comments: Resting in bed    HENT:      Mouth/Throat:      Mouth: Mucous membranes are moist.   Cardiovascular:      Rate and Rhythm: Normal rate.   Pulmonary:      Effort: Pulmonary effort is normal.      Breath sounds: Normal breath sounds.      Comments: Diminished  O2 4 lit NC  Abdominal:      General: Bowel sounds are normal.   Genitourinary:     Comments: Denies dysuria   Musculoskeletal:      Cervical back: Neck supple.      Comments: PT/OT  WC transfers  Exertional dyspnea   BLE edema resolved    Skin:     General: Skin is warm and dry.   Neurological:      Mental Status: She is alert. Mental status is at baseline.   Psychiatric:         Mood and Affect: Mood normal.      Comments: Conversational                    Electronically Signed By: TERRY Rhodes   8/3/23  7:59 AM

## 2023-08-03 PROBLEM — G47.33 OSA TREATED WITH BIPAP: Status: ACTIVE | Noted: 2023-01-01

## 2023-08-03 NOTE — PROGRESS NOTES
Patient ID: Ruth Escobedo is a 62 y.o. female who is acute skilled care being seen and evaluated for multiple medical problems.  [id number]   1960    /82   Pulse 71   Temp 36.1 °C (97 °F)   Resp 16   Wt 93.4 kg (206 lb)   SpO2 96%   BMI 36.49 kg/m²     Assessment/Plan  Problem List Items Addressed This Visit       Heart failure (CMS/[facility]) - Primary     Lasix 20 mg  by mouth daily          Anxiety syndrome     Vistaril  25 mg by mouth BID as needed   Buspirone 15 mg by mouth TID         COPD (chronic obstructive pulmonary disease) (CMS/[facility])     O2 4 lit NC  Duoneb every 4 hrs and every two hrs as needed   Budesonide 0.5 ml inhalation BID   Trelegy 100-62.5- 25 mcg one inhalation daily   Proventil 90 mcg two inhalations every 4 hrs as needed   Roflumilast 250 mcg by mouth daily   Acetylcysteine 20% inhalation every 6 hrs (Stop date 7/31/2023)   Dr Sellers (pulmonology)             DM2 (diabetes mellitus, type 2) (CMS/[facility])     Lantus 30 units subcutaneous daily   Humalog 8 units subcutaneous TID with meals  Humalog SSC as prescribed   Glipizide 5 mg by mouth daily          GERD (gastroesophageal reflux disease)     Protonix 40 mg by mouth daily           Constipation     MiraLax 17 grams by mouth daily as needed  Docusate 100 mg by mouth daily as needed  MOM 30 ml by mouth daily as needed          Recurrent pneumonia     Cefepime 2 grams IV every 8 hrs completed 7/29/2023  DC PICC line              Hypokalemia     Potassium 30 meq by mouth daily    8/1/2023 K- 3.7         Vitamin D deficiency     D3 50 mcg by mouth daily           Physical deconditioning     PT/OT              HPI: Client continues to receive PT/OT services. Cefepime completed, afebrile. Client denies HA, dizziness, or lightheadedness. Denies CP, current SOB, or increased edema. O2 4 lit NC. Denies abdominal pain, N/V, diarrhea, or constipation. Denies dysuria. Denies change in appetite. Client has chronic shoulder and lumbar pain.      Review of Systems ROS as described in in HPI     Physical Exam  Constitutional:       Comments: Resting in bed    HENT:      Mouth/Throat:      Mouth: Mucous membranes are moist.   Cardiovascular:      Rate and Rhythm: Normal rate.   Pulmonary:      Effort: Pulmonary effort is normal.      Breath sounds: Normal breath sounds.      Comments: Diminished  O2 4 lit NC  Abdominal:      General: Bowel sounds are normal.   Genitourinary:     Comments: Denies dysuria   Musculoskeletal:      Cervical back: Neck supple.      Comments: PT/OT  WC transfers  Exertional dyspnea   BLE edema resolved    Skin:     General: Skin is warm and dry.   Neurological:      Mental Status: She is alert. Mental status is at baseline.   Psychiatric:         Mood and Affect: Mood normal.      Comments: Conversational

## 2023-08-03 NOTE — ASSESSMENT & PLAN NOTE
MiraLax 17 grams by mouth daily as needed  Docusate 100 mg by mouth daily as needed  MOM 30 ml by mouth daily as needed

## 2023-08-03 NOTE — ASSESSMENT & PLAN NOTE
O2 4 lit NC  Duoneb every 4 hrs and every two hrs as needed   Budesonide 0.5 ml inhalation BID   Trelegy 100-62.5- 25 mcg one inhalation daily   Proventil 90 mcg two inhalations every 4 hrs as needed   Roflumilast 250 mcg by mouth daily   Acetylcysteine 20% inhalation every 6 hrs (Stop date 7/31/2023)   Dr Sellers (pulmonology)

## 2023-08-07 NOTE — LETTER
Patient: Ruth Escobedo  : 1960    Encounter Date: 2023    Patient ID: Ruth Escobedo is a 62 y.o. female who is acute skilled care being seen and evaluated for multiple medical problems.  [id number]   1960    /74   Pulse 84   Temp 36.7 °C (98 °F)   Resp 20   Wt 90.3 kg (199 lb)   SpO2 96%   BMI 35.25 kg/m²     Assessment/Plan  Problem List Items Addressed This Visit       Heart failure (CMS/[facility]) - Primary     Lasix 20 mg  by mouth daily          COPD (chronic obstructive pulmonary disease) (CMS/[facility])     O2 4 lit NC  Duoneb every 4 hrs and every two hrs as needed   Budesonide 0.5 ml inhalation BID   Trelegy 100-62.5- 25 mcg one inhalation daily   Proventil 90 mcg two inhalations every 4 hrs as needed   Roflumilast 250 mcg by mouth daily   Dr Sellers (pulmonology) 2023          Recurrent pneumonia     Cefepime 2 grams IV every 8 hrs completed 2023  C/O SOB  Repeat CXR  Am BMP and CBC with diff          Physical deconditioning     PT/OT              HPI: Client continues to receive PT/OT services. Client C/O SOB, worse with exertion. Cefepime for PN completed. Will repeat CXR, BMP, and CBC with diff. Client denies HA, dizziness, or lightheadedness. Denies CP or increased edema. O2 4 lit NC. Denies abdominal pain, N/V, diarrhea, or constipation. Denies dysuria.  Denies change in appetite. C/O chronic shoulder and lower back pain.     Review of Systems ROS as described in in HPI     Physical Exam  Constitutional:       Comments: Resting in bed    HENT:      Mouth/Throat:      Mouth: Mucous membranes are moist.   Cardiovascular:      Rate and Rhythm: Normal rate.   Pulmonary:      Comments: Diminished   O2 4 lit NC  C/O exertional dyspnea     Abdominal:      General: Bowel sounds are normal.   Genitourinary:     Comments: Denies dysuria   Musculoskeletal:      Cervical back: Neck supple.      Comments: PT/OT   No leg edema    Skin:     General: Skin is warm and dry.   Neurological:       Mental Status: She is alert and oriented to person, place, and time.   Psychiatric:      Comments: Conversational                    Electronically Signed By: TERRY Rhodes   8/9/23 11:25 AM

## 2023-08-09 NOTE — PROGRESS NOTES
Patient ID: Ruth Escobedo is a 62 y.o. female who is acute skilled care being seen and evaluated for multiple medical problems.  [id number]   1960    /74   Pulse 84   Temp 36.7 °C (98 °F)   Resp 20   Wt 90.3 kg (199 lb)   SpO2 96%   BMI 35.25 kg/m²     Assessment/Plan  Problem List Items Addressed This Visit       Heart failure (CMS/[facility]) - Primary     Lasix 20 mg  by mouth daily          COPD (chronic obstructive pulmonary disease) (CMS/[facility])     O2 4 lit NC  Duoneb every 4 hrs and every two hrs as needed   Budesonide 0.5 ml inhalation BID   Trelegy 100-62.5- 25 mcg one inhalation daily   Proventil 90 mcg two inhalations every 4 hrs as needed   Roflumilast 250 mcg by mouth daily   Dr Sellers (pulmonology) 8/21/2023          Recurrent pneumonia     Cefepime 2 grams IV every 8 hrs completed 7/29/2023  C/O SOB  Repeat CXR  Am BMP and CBC with diff          Physical deconditioning     PT/OT              HPI: Client continues to receive PT/OT services. Client C/O SOB, worse with exertion. Cefepime for PN completed. Will repeat CXR, BMP, and CBC with diff. Client denies HA, dizziness, or lightheadedness. Denies CP or increased edema. O2 4 lit NC. Denies abdominal pain, N/V, diarrhea, or constipation. Denies dysuria.  Denies change in appetite. C/O chronic shoulder and lower back pain.     Review of Systems ROS as described in in HPI     Physical Exam  Constitutional:       Comments: Resting in bed    HENT:      Mouth/Throat:      Mouth: Mucous membranes are moist.   Cardiovascular:      Rate and Rhythm: Normal rate.   Pulmonary:      Comments: Diminished   O2 4 lit NC  C/O exertional dyspnea     Abdominal:      General: Bowel sounds are normal.   Genitourinary:     Comments: Denies dysuria   Musculoskeletal:      Cervical back: Neck supple.      Comments: PT/OT   No leg edema    Skin:     General: Skin is warm and dry.   Neurological:      Mental Status: She is alert and oriented to person, place, and time.    Psychiatric:      Comments: Conversational

## 2023-08-09 NOTE — ASSESSMENT & PLAN NOTE
O2 4 lit NC  Duoneb every 4 hrs and every two hrs as needed   Budesonide 0.5 ml inhalation BID   Trelegy 100-62.5- 25 mcg one inhalation daily   Proventil 90 mcg two inhalations every 4 hrs as needed   Roflumilast 250 mcg by mouth daily   Dr Sellers (pulmonology) 8/21/2023

## 2023-08-09 NOTE — ASSESSMENT & PLAN NOTE
Cefepime 2 grams IV every 8 hrs completed 7/29/2023  C/O SOB  Repeat CXR  Am BMP and CBC with diff

## 2023-08-15 NOTE — LETTER
Patient: Ruth Escobedo  : 1960    Encounter Date: 08/15/2023    Patient ID: Ruth Escobedo is a 62 y.o. female who is acute skilled care being seen and evaluated for multiple medical problems.  [id number]   1960    /81   Pulse 70   Temp 36.8 °C (98.2 °F)   Resp 19   Wt 90.7 kg (200 lb)   SpO2 94%   BMI 35.43 kg/m²     Assessment/Plan  Problem List Items Addressed This Visit       Heart failure (CMS/[facility]) - Primary     Lasix 40 mg  by mouth daily           COPD (chronic obstructive pulmonary disease) (CMS/[facility])     O2 4 lit NC  Duoneb every 4 hrs and every two hrs as needed   Budesonide 0.5 ml inhalation BID   Trelegy 100-62.5- 25 mcg one inhalation daily   Proventil 90 mcg two inhalations every 4 hrs as needed   Roflumilast 250 mcg by mouth daily   Prednisone 40 mg by mouth daily x 5 days then decrease to 20 mg by mouth daily   Dr Slelers (pulmonology) 2023          GERD (gastroesophageal reflux disease)     Protonix 40 mg by mouth daily                 HPI: Staff report client continues to have episodes of dyspnea with minimal exertion. CXR 2023 (-) for PN. Client prescribed prophylactic Azithromycin. Will increase Prednisone to 40 mg x 5 days. Client denies HA, current dizziness, or lightheadedness. Denies CP or increased edema. O2 4 lit NC. C/O exertional dyspnea. Denies abdominal pain, N/V, diarrhea, or constipation. Denies dysuria. States appetite fair. C/O chronic bilateral shoulder and back pain.       Review of Systems ROS as described in in HPI     Physical Exam  Constitutional:       Comments: Sitting in bed    HENT:      Mouth/Throat:      Mouth: Mucous membranes are moist.   Cardiovascular:      Rate and Rhythm: Normal rate.   Pulmonary:      Comments: Lung sounds diminished   O2 4 lit NC  Exertional dyspnea   Abdominal:      General: Bowel sounds are normal.   Genitourinary:     Comments: Denies dysuria   Musculoskeletal:      Cervical back: Neck supple.      Comments:  PT/OT  WC transfers   Slight bilateral ankle edema    Skin:     General: Skin is warm.   Neurological:      Mental Status: She is alert and oriented to person, place, and time.   Psychiatric:      Comments: Episodes of heightened anxiety associated with exertional dyspnea                     Electronically Signed By: TERRY Rhodes   8/16/23  2:17 PM

## 2023-08-16 PROBLEM — D80.3 IGG DEFICIENCY (MULTI): Status: ACTIVE | Noted: 2023-01-01

## 2023-08-16 NOTE — ASSESSMENT & PLAN NOTE
O2 4 lit NC  Duoneb every 4 hrs and every two hrs as needed   Budesonide 0.5 ml inhalation BID   Trelegy 100-62.5- 25 mcg one inhalation daily   Proventil 90 mcg two inhalations every 4 hrs as needed   Roflumilast 250 mcg by mouth daily   Prednisone 40 mg by mouth daily x 5 days then decrease to 20 mg by mouth daily   Dr Sellers (pulmonology) 8/21/2023

## 2023-08-16 NOTE — PROGRESS NOTES
Patient ID: Ruth Escobedo is a 62 y.o. female who is acute skilled care being seen and evaluated for multiple medical problems.  [id number]   1960    /81   Pulse 70   Temp 36.8 °C (98.2 °F)   Resp 19   Wt 90.7 kg (200 lb)   SpO2 94%   BMI 35.43 kg/m²     Assessment/Plan  Problem List Items Addressed This Visit       Heart failure (CMS/[facility]) - Primary     Lasix 40 mg  by mouth daily           COPD (chronic obstructive pulmonary disease) (CMS/[facility])     O2 4 lit NC  Duoneb every 4 hrs and every two hrs as needed   Budesonide 0.5 ml inhalation BID   Trelegy 100-62.5- 25 mcg one inhalation daily   Proventil 90 mcg two inhalations every 4 hrs as needed   Roflumilast 250 mcg by mouth daily   Prednisone 40 mg by mouth daily x 5 days then decrease to 20 mg by mouth daily   Dr Sellers (pulmonology) 8/21/2023          GERD (gastroesophageal reflux disease)     Protonix 40 mg by mouth daily                 HPI: Staff report client continues to have episodes of dyspnea with minimal exertion. CXR 8/9/2023 (-) for PN. Client prescribed prophylactic Azithromycin. Will increase Prednisone to 40 mg x 5 days. Client denies HA, current dizziness, or lightheadedness. Denies CP or increased edema. O2 4 lit NC. C/O exertional dyspnea. Denies abdominal pain, N/V, diarrhea, or constipation. Denies dysuria. States appetite fair. C/O chronic bilateral shoulder and back pain.       Review of Systems ROS as described in in HPI     Physical Exam  Constitutional:       Comments: Sitting in bed    HENT:      Mouth/Throat:      Mouth: Mucous membranes are moist.   Cardiovascular:      Rate and Rhythm: Normal rate.   Pulmonary:      Comments: Lung sounds diminished   O2 4 lit NC  Exertional dyspnea   Abdominal:      General: Bowel sounds are normal.   Genitourinary:     Comments: Denies dysuria   Musculoskeletal:      Cervical back: Neck supple.      Comments: PT/OT  WC transfers   Slight bilateral ankle edema    Skin:     General:  Skin is warm.   Neurological:      Mental Status: She is alert and oriented to person, place, and time.   Psychiatric:      Comments: Episodes of heightened anxiety associated with exertional dyspnea

## 2023-08-22 NOTE — LETTER
Patient: Ruth Escobedo  : 1960    Encounter Date: 2023    Patient ID: Ruth Escobedo is a 62 y.o. female who is acute skilled care being seen and evaluated for multiple medical problems.  [id number]   1960    /76   Pulse 91   Temp 37 °C (98.6 °F)   Resp 16   Wt 93.9 kg (207 lb)   SpO2 96%   BMI 36.67 kg/m²     Assessment/Plan  Problem List Items Addressed This Visit       Heart failure (CMS/[facility]) - Primary     Lasix 40 mg  by mouth daily            COPD (chronic obstructive pulmonary disease) (CMS/[facility])     O2 4 lit NC  Duoneb every 4 hrs and every two hrs as needed   Budesonide 0.5 ml inhalation BID   Trelegy 100-62.5- 25 mcg one inhalation daily   Proventil 90 mcg two inhalations every 4 hrs as needed   Roflumilast 250 mcg by mouth daily   Prednisone 20 mg by mouth daily   Dr Sellers (pulmonology) 2023 completed [facility]          GERD (gastroesophageal reflux disease)     Protonix 40 mg by mouth daily             Hypokalemia     Potassium 30 meq by mouth daily    2023 K- 3.7  2023 K- 3.8         Physical deconditioning     P/TOT              HPI: Client continues to receive PT/OT services. Dr Sellers apt completed- [facility] for home use. Client denies HA, dizziness, or lightheadedness. Denies CP or Cough. C/O exertional dyspnea. O2 4 lit NC. Denies abdominal pain, N/V, diarrhea, or constipation. Denies dysuria. Denies change in appetite. C/O chronic shoulder and back pain.     Review of Systems ROS as described in in HPI     Physical Exam  Constitutional:       Comments: Sitting in recliner    HENT:      Head: Normocephalic.      Mouth/Throat:      Mouth: Mucous membranes are moist.   Cardiovascular:      Rate and Rhythm: Normal rate.   Pulmonary:      Effort: Pulmonary effort is normal.      Breath sounds: Wheezing present.      Comments: 4 lit NC   [facility]   Abdominal:      General: Bowel sounds are normal.   Genitourinary:     Comments: Denies  dysuria   Musculoskeletal:      Cervical back: Neck supple.      Right lower leg: Edema (plus one) present.      Left lower leg: Edema (plus one) present.      Comments: PT/OT  Physical deconditioning      Skin:     General: Skin is warm and dry.   Neurological:      Mental Status: She is alert and oriented to person, place, and time.   Psychiatric:         Mood and Affect: Mood normal.      Comments: Episodes of heightened anxiety                    Electronically Signed By: TERRY Rhodes   8/23/23  9:27 AM

## 2023-08-23 PROBLEM — J44.1 COPD EXACERBATION (MULTI): Status: ACTIVE | Noted: 2023-01-01

## 2023-08-23 NOTE — ASSESSMENT & PLAN NOTE
O2 4 lit NC  Duoneb every 4 hrs and every two hrs as needed   Budesonide 0.5 ml inhalation BID   Trelegy 100-62.5- 25 mcg one inhalation daily   Proventil 90 mcg two inhalations every 4 hrs as needed   Roflumilast 250 mcg by mouth daily   Prednisone 20 mg by mouth daily   Dr Sellers (pulmonology) 8/21/2023 completed [facility] use

## 2023-08-23 NOTE — PROGRESS NOTES
Patient ID: Ruth Escobedo is a 62 y.o. female who is acute skilled care being seen and evaluated for multiple medical problems.  [id number]   1960    /76   Pulse 91   Temp 37 °C (98.6 °F)   Resp 16   Wt 93.9 kg (207 lb)   SpO2 96%   BMI 36.67 kg/m²     Assessment/Plan  Problem List Items Addressed This Visit       Heart failure (CMS/[facility]) - Primary     Lasix 40 mg  by mouth daily            COPD (chronic obstructive pulmonary disease) (CMS/[facility])     O2 4 lit NC  Duoneb every 4 hrs and every two hrs as needed   Budesonide 0.5 ml inhalation BID   Trelegy 100-62.5- 25 mcg one inhalation daily   Proventil 90 mcg two inhalations every 4 hrs as needed   Roflumilast 250 mcg by mouth daily   Prednisone 20 mg by mouth daily   Dr Sellers (pulmonology) 8/21/2023 completed [facility] use          GERD (gastroesophageal reflux disease)     Protonix 40 mg by mouth daily             Hypokalemia     Potassium 30 meq by mouth daily    8/1/2023 K- 3.7  8/16/2023 K- 3.8         Physical deconditioning     P/TOT              HPI: Client continues to receive PT/OT services. Dr Sellers apt completed- [facility] home use. Client denies HA, dizziness, or lightheadedness. Denies CP or Cough. C/O exertional dyspnea. O2 4 lit NC. Denies abdominal pain, N/V, diarrhea, or constipation. Denies dysuria. Denies change in appetite. C/O chronic shoulder and back pain.     Review of Systems ROS as described in in HPI     Physical Exam  Constitutional:       Comments: Sitting in recliner    HENT:      Head: Normocephalic.      Mouth/Throat:      Mouth: Mucous membranes are moist.   Cardiovascular:      Rate and Rhythm: Normal rate.   Pulmonary:      Effort: Pulmonary effort is normal.      Breath sounds: Wheezing present.      Comments: 4 lit NC   [facility]   Abdominal:      General: Bowel sounds are normal.   Genitourinary:     Comments: Denies dysuria   Musculoskeletal:      Cervical back: Neck supple.      Right  lower leg: Edema (plus one) present.      Left lower leg: Edema (plus one) present.      Comments: PT/OT  Physical deconditioning      Skin:     General: Skin is warm and dry.   Neurological:      Mental Status: She is alert and oriented to person, place, and time.   Psychiatric:         Mood and Affect: Mood normal.      Comments: Episodes of heightened anxiety

## 2023-08-29 NOTE — LETTER
Patient: Ruth Escobedo  : 1960    Encounter Date: 2023    Patient ID: Ruth Escobedo is a 62 y.o. female who is acute skilled care being seen and evaluated for multiple medical problems.  [id number]   1960    /72   Pulse 90   Temp 36.7 °C (98 °F)   Resp 19   Wt 91.2 kg (201 lb)   SpO2 96%   BMI 35.61 kg/m²     Assessment/Plan  Problem List Items Addressed This Visit       Heart failure (CMS/[facility]) - Primary     Lasix 40 mg  by mouth daily           Anxiety syndrome     Hydroxyzine 25 mg by mouth every 8 hrs as needed  Buspirone 15 mg by mouth TID         COPD (chronic obstructive pulmonary disease) (CMS/[facility])     O2 4 lit NC  Duoneb every 4 hrs and every two hrs as needed   Budesonide 0.5 ml inhalation BID   Trelegy 100-62.5- 25 mcg one inhalation daily   Proventil 90 mcg two inhalations every 4 hrs as needed   Roflumilast 250 mcg by mouth daily   Prednisone 10 mg by mouth daily   Dr Sellers (pulmonology) 2023 completed [facility] use , Follow up apt 2023          Hypokalemia     Potassium 30 meq by mouth daily    2023 K- 3.7  2023 K- 3.8         Physical deconditioning     PT/OT              HPI: Client continues to receive PT services. Pending DC to home 2023. Client denies HA, dizziness, or lightheadedness. Denies CP, current SOB, Cough, or increased edema. O2 4 lit NC. Denies abdominal pain, N/V, diarrhea, or constipation. Denies dysuria. Denies change in appetite. Denies insomnia. No current C/O pain. Client C/O of episodes heightened anxiety.      Review of Systems ROS as described in in HPI     Physical Exam  Constitutional:       Comments: Resting in bed    HENT:      Head: Normocephalic.      Mouth/Throat:      Mouth: Mucous membranes are moist.   Cardiovascular:      Rate and Rhythm: Normal rate.   Pulmonary:      Effort: Pulmonary effort is normal.      Breath sounds: Wheezing present.      Comments: 4 lit NC  Abdominal:      General: Bowel  sounds are normal.   Genitourinary:     Comments: Denies dysuria   Musculoskeletal:      Cervical back: Neck supple.      Comments: WC transfers   Slight ankle edema   Skin:     General: Skin is warm and dry.   Neurological:      Mental Status: She is alert and oriented to person, place, and time.   Psychiatric:         Mood and Affect: Mood normal.      Comments: C/O episodes of heightened anxiety- currently calm                    Electronically Signed By: TERRY Rhodes   8/31/23  6:29 AM

## 2023-08-31 NOTE — ASSESSMENT & PLAN NOTE
O2 4 lit NC  Duoneb every 4 hrs and every two hrs as needed   Budesonide 0.5 ml inhalation BID   Trelegy 100-62.5- 25 mcg one inhalation daily   Proventil 90 mcg two inhalations every 4 hrs as needed   Roflumilast 250 mcg by mouth daily   Prednisone 10 mg by mouth daily   Dr Sellers (pulmonology) 8/21/2023 completed [facility] use , Follow up apt 11/20/2023

## 2023-08-31 NOTE — PROGRESS NOTES
Patient ID: Ruth Escobedo is a 62 y.o. female who is acute skilled care being seen and evaluated for multiple medical problems.  [id number]   1960    /72   Pulse 90   Temp 36.7 °C (98 °F)   Resp 19   Wt 91.2 kg (201 lb)   SpO2 96%   BMI 35.61 kg/m²     Assessment/Plan  Problem List Items Addressed This Visit       Heart failure (CMS/[facility]) - Primary     Lasix 40 mg  by mouth daily           Anxiety syndrome     Hydroxyzine 25 mg by mouth every 8 hrs as needed  Buspirone 15 mg by mouth TID         COPD (chronic obstructive pulmonary disease) (CMS/[facility])     O2 4 lit NC  Duoneb every 4 hrs and every two hrs as needed   Budesonide 0.5 ml inhalation BID   Trelegy 100-62.5- 25 mcg one inhalation daily   Proventil 90 mcg two inhalations every 4 hrs as needed   Roflumilast 250 mcg by mouth daily   Prednisone 10 mg by mouth daily   Dr Sellers (pulmonology) 8/21/2023 completed [facility] Home use , Follow up apt 11/20/2023          Hypokalemia     Potassium 30 meq by mouth daily    8/1/2023 K- 3.7  8/16/2023 K- 3.8         Physical deconditioning     PT/OT              HPI: Client continues to receive PT services. Pending DC to home 9/2/2023. Client denies HA, dizziness, or lightheadedness. Denies CP, current SOB, Cough, or increased edema. O2 4 lit NC. Denies abdominal pain, N/V, diarrhea, or constipation. Denies dysuria. Denies change in appetite. Denies insomnia. No current C/O pain. Client C/O of episodes heightened anxiety.      Review of Systems ROS as described in in HPI     Physical Exam  Constitutional:       Comments: Resting in bed    HENT:      Head: Normocephalic.      Mouth/Throat:      Mouth: Mucous membranes are moist.   Cardiovascular:      Rate and Rhythm: Normal rate.   Pulmonary:      Effort: Pulmonary effort is normal.      Breath sounds: Wheezing present.      Comments: 4 lit NC  Abdominal:      General: Bowel sounds are normal.   Genitourinary:     Comments: Denies dysuria    Musculoskeletal:      Cervical back: Neck supple.      Comments: WC transfers   Slight ankle edema   Skin:     General: Skin is warm and dry.   Neurological:      Mental Status: She is alert and oriented to person, place, and time.   Psychiatric:         Mood and Affect: Mood normal.      Comments: C/O episodes of heightened anxiety- currently calm

## 2023-09-05 PROBLEM — J44.9 COPD, SEVERE (MULTI): Status: ACTIVE | Noted: 2023-01-01

## 2023-09-05 NOTE — PROGRESS NOTES
Patient ID:   Ruth Escobedo is a 62 y.o. female with PMH remarkable for COPD, CAD, depression, GERD, who presents to the office today for nursing home follow up.    Hospitalization and SNF stay Discharge Follow Up:  Date(s): 7/15/2023 to 9/2/2023. Went to ER on 9/4/2023 d/t SOB  Location: [facility] -> [facility]  Reason Presented to ER: SOB, VILLAFUERTE  Synopsis: Treated for COPD Exacerbation, Anxiety, Hypokalemia.  Recommended FU: Pt was started on Auto-SV (Trilogy) at the nursing home for recurrent admissions for COPD and pneumonia.   - reports that she is following with pulm  - discussed with pt and her  regarding frequent hospitalization and palliative care program, they are interested in learning more about it therefore, will place referral.   - went over med list in detail  - reviewed discharge summaries from [facility] and [facility]  - printed a med list for pt for reference    REVIEW OF SYSTEMS:  Review of Systems   Constitutional:  Positive for fatigue.   Respiratory:  Positive for cough, shortness of breath and wheezing.    Neurological:  Positive for weakness.   Psychiatric/Behavioral:  The patient is nervous/anxious.    All other systems reviewed and are negative.    VITAL SIGNS:  Vitals:    09/05/23 1534   BP: 111/72   Pulse: 100   SpO2: 95%     ALLERGIES:  Allergies   Allergen Reactions    Aspirin Nausea Only    Ketorolac Unknown    Lorazepam Unknown    Oxycodone Unknown      Physical Exam  Vitals reviewed.   Constitutional:       General: She is awake. She is not in acute distress.     Appearance: Normal appearance. She is overweight. She is ill-appearing.      Interventions: Nasal cannula in place.   HENT:      Head: Normocephalic and atraumatic.      Right Ear: Tympanic membrane and external ear normal.      Left Ear: Tympanic membrane and external ear normal.      Nose: Nose normal.      Mouth/Throat:      Mouth: Mucous membranes are moist.      Pharynx: Oropharynx is clear.   Eyes:       Conjunctiva/sclera: Conjunctivae normal.      Pupils: Pupils are equal, round, and reactive to light.   Cardiovascular:      Rate and Rhythm: Normal rate and regular rhythm.      Heart sounds: Normal heart sounds. No murmur heard.  Pulmonary:      Effort: Pulmonary effort is normal. No respiratory distress.      Breath sounds: Decreased breath sounds, wheezing and rhonchi present.      Comments: Conversational dyspnea  Abdominal:      General: There is distension.      Palpations: Abdomen is soft. There is no mass.      Tenderness: There is abdominal tenderness.   Musculoskeletal:         General: Normal range of motion.      Cervical back: Normal range of motion and neck supple.      Comments: weakness   Skin:     General: Skin is warm and dry.      Findings: Bruising and ecchymosis present.   Neurological:      General: No focal deficit present.      Mental Status: She is alert and oriented to person, place, and time. Mental status is at baseline.      Sensory: Sensory deficit present.      Motor: Weakness present.      Coordination: Coordination abnormal.      Gait: Gait abnormal.   Psychiatric:         Mood and Affect: Mood is anxious and depressed. Affect is flat.         Behavior: Behavior normal. Behavior is cooperative.       MEDICATIONS:  Current Outpatient Medications on File Prior to Visit   Medication Sig Dispense Refill    Accu-Chek Twyla Plus test strp strip TEST BLOOD SUGAR ONCE A DAY AS DIRECTED 100 strip 0    albuterol 2.5 mg /3 mL (0.083 %) nebulizer solution Inhale 1 Units every 4 hours if needed.      albuterol sulfate (Proair Digihaler) 90 mcg/actuation aero powdr breath act w/sensor inhaler Inhale 2 puffs every 4 hours.      ascorbic acid (Vitamin C) 500 mg tablet Take 1 tablet (500 mg) by mouth once daily.      Autolet (Accu-Chek FastClix Lancing Dev) lancing device 1 each once daily. Use as instructed      azithromycin (Zithromax) 250 mg tablet TAKE ONE TABLET MONDAY WEDNESDAY AND FRIDAY       blood-glucose meter (Accu-Chek Twyla Plus Meter) misc once daily.      busPIRone (Buspar) 15 mg tablet Take 1 tablet (15 mg) by mouth 3 times a day.      calcitonin, salmon, (Miacalcin) 200 unit/actuation nasal spray Administer 1 spray into one nostril once daily. ALTERNATING NOSTRILS EVERY DAY      docusate sodium (Colace) 100 mg capsule Take 1 capsule (100 mg) by mouth once daily.      escitalopram (Lexapro) 10 mg tablet Take 1 tablet (10 mg) by mouth once daily.      ferrous gluconate 324 (38 Fe) mg tablet Take 1 tablet (324 mg) by mouth once daily with a meal.      fluticasone-umeclidin-vilanter (Trelegy Ellipta) 200-62.5-25 mcg blister with device Inhale 1 puff once daily.      furosemide (Lasix) 20 mg tablet Take 1 tablet (20 mg) by mouth once daily.      glipiZIDE (Glucotrol) 5 mg tablet TAKE 1 TABLET BY MOUTH TWICE A  tablet 0    guaiFENesin (Mucinex) 600 mg 12 hr tablet Take 1 tablet (600 mg) by mouth twice a day.      ipratropium-albuteroL (Duo-Neb) 0.5-2.5 mg/3 mL nebulizer solution Inhale 3 mL 4 times a day.      lancets (Accu-Chek Fastclix Lancet Drum) misc once daily.      magnesium oxide (Mag-Ox) 420 mg tablet TAKE 1 TABLET BY MOUTH EVERY DAY 90 tablet 0    mirtazapine (Remeron) 15 mg tablet TAKE 1 TABLET BY MOUTH EVERY DAY 90 tablet 0    naloxone (Narcan) 0.4 mg/mL injection as directed      nystatin (Mycostatin) 100,000 unit/gram powder Apply 1 Application topically. APPLY 2-3 TIMES DAILY      ondansetron (Zofran) 4 mg tablet Take 1 tablet (4 mg) by mouth every 8 hours if needed.      oxygen (O2) gas therapy for  a portable oxygen concentrator      pantoprazole (ProtoNix) 40 mg EC tablet TAKE 1 TABLET BY MOUTH EVERY DAY 90 tablet 0    polyethylene glycol (Glycolax, Miralax) 17 gram/dose powder Take 17 g by mouth once daily.      potassium chloride CR 20 mEq ER tablet Take 1 tablet (20 mEq) by mouth once daily. Do not crush or chew.      primidone (Mysoline) 50 mg tablet Take 1 tablet (50 mg)  by mouth once daily at bedtime.      QUEtiapine (SEROquel) 25 mg tablet Take 1 tablet (25 mg) by mouth once daily at bedtime.      roflumilast (Daliresp) 250 mcg tablet Take 1 tablet (250 mcg) by mouth every other day.      [DISCONTINUED] hydrOXYzine HCL (Atarax) 25 mg tablet Take 1 tablet (25 mg) by mouth every 8 hours if needed for itching.      [DISCONTINUED] predniSONE (Deltasone) 20 mg tablet Take 1 tablet (20 mg) by mouth once daily.      [DISCONTINUED] QUEtiapine (SEROquel) 25 mg tablet TAKE 1/2 TAB IN THE AM AND NOON, AND ONE TABLET AT BEDTIME (Patient taking differently: Take 1 tablet (25 mg) by mouth once daily at bedtime.) 180 tablet 0    HYDROcodone-acetaminophen (Norco) 5-325 mg tablet Take 1 tablet by mouth every 6 hours if needed for severe pain (7 - 10).      [DISCONTINUED] albuterol sulfate (PROVENTIL HFA INHL) Inhale every 4 hours if needed.      [DISCONTINUED] cholecalciferol (Vitamin D-3) 50 mcg (2,000 unit) capsule Take 1 capsule (50 mcg) by mouth once daily.      [DISCONTINUED] ferrous gluconate 324 (38 Fe) mg tablet TAKE 1 TABLET BY MOUTH TWICE A  tablet 0    [DISCONTINUED] fluticasone-umeclidin-vilanter (Trelegy Ellipta) 100-62.5-25 mcg blister with device Inhale 1 puff once daily.      [DISCONTINUED] furosemide (Lasix) 40 mg tablet TAKE 1 TABLET BY MOUTH EVERY DAY 90 tablet 0    [DISCONTINUED] glipiZIDE (Glucotrol) 5 mg tablet TAKE 1 TABLET BY MOUTH TWICE DAILY 180 tablet 0    [DISCONTINUED] potassium chloride CR (Klor-Con) 10 mEq ER tablet Take 1 tablet (10 mEq) by mouth once daily.      [DISCONTINUED] prednisoLONE (Sterane) 5 mg tablet tablet Take 1 tablet (5 mg) by mouth once daily.       No current facility-administered medications on file prior to visit.       LABORATORY DATA:  Lab Results   Component Value Date    WBC 17.3 (H) 09/04/2023    HGB 13.1 09/04/2023    HCT 43.4 09/04/2023     09/04/2023    CHOL 260 (H) 08/10/2022    TRIG 110 08/10/2022    HDL 79.0 08/10/2022     ALT 33 09/04/2023    AST 18 09/04/2023     09/04/2023    K 4.1 09/04/2023    CL 96 (L) 09/04/2023    CREATININE 0.89 09/04/2023    BUN 10 09/04/2023    CO2 31 09/04/2023    TSH 0.24 (L) 03/05/2021    INR 1.2 (H) 09/04/2023    HGBA1C 8.1 (H) 07/21/2023       ASSESSMENT AND PLAN:  Assessment/Plan   Diagnoses and all orders for this visit:  Health care maintenance  Anxiety syndrome  -     hydrOXYzine HCL (Atarax) 25 mg tablet; Take 2 tablets (50 mg) by mouth every 8 hours if needed for itching or anxiety.  Chronic obstructive pulmonary disease, unspecified COPD type (CMS/HCC)  -     predniSONE (Deltasone) 10 mg tablet; Take 4 tabs (40mg) daily for 5 days then 3 tabs (30mg) daily for 5 days, then 2 tabs (20mg) daily for 5 days then 1 tab (10mg) daily for 5 days then decrease to (1/2 tab) 5mg daily indefinitely  -     Referral to Palliative Care; Future  Heart failure, unspecified HF chronicity, unspecified heart failure type (CMS/HCC)  -     Referral to Palliative Care; Future  IgG deficiency (CMS/HCC)  Essential thrombocythemia (CMS/HCC)  Recurrent major depressive disorder, remission status unspecified (CMS/[facility])  Iron deficiency anemia, unspecified iron deficiency anemia type  Comments:  - c/w ferrous gluconate daily      ------  Written by Peggy Norton RN, acting as a scribe for Dr. Landers. This note accurately reflects the work and decisions made by Dr. Landers.     I, Dr. Landers, attest all medical record entries made by the scribe were under my direction and were personally dictated by me. I have reviewed the chart and agree that the record accurately reflects my performance of the history, physical exam, and assessment and plan.

## 2023-09-26 NOTE — LETTER
Patient: Ruth Escobedo  : 1960    Encounter Date: 2023    Patient ID: Ruth Escobedo is a 62 y.o. female who is acute skilled care being seen and evaluated for multiple medical problems.  [id number]   1960    /74   Pulse 76   Temp 36.8 °C (98.3 °F)   Resp 16   Wt 84.8 kg (187 lb)   SpO2 96%   BMI 33.13 kg/m²     Assessment/Plan  Problem List Items Addressed This Visit       Anxiety syndrome     Hydroxyzine 25 mg by mouth BID as needed  Buspirone 15 mg by mouth TID         COPD (chronic obstructive pulmonary disease) (CMS/[facility])     O2 4 lit NC  Duoneb every 4 hrs and every two hrs as needed   Budesonide 0.5 ml inhalation BID   Acetylcysteine 10% aerosol QID  Trelegy 100-62.5- 25 mcg one inhalation daily   Proventil 90 mcg two inhalations every 4 hrs as needed   Roflumilast 500 mcg by mouth daily   Prednisone 20 mg by mouth daily   Azithromycin 500 mg by mouth every MWF  Trilogy          Depressive disorder     Lexapro 10 mg by mouth daily   Mirtazapine 15 mg by mouth daily          DM2 (diabetes mellitus, type 2) (CMS/[facility]) - Primary     Humalog 8 units subcutaneous TID with meals  Humalog SSC as prescribed            GERD (gastroesophageal reflux disease)     Protonix 40 mg by mouth daily              DVT (deep venous thrombosis) (CMS/[facility])     H/O LUE DVT 2023  Apixaban 5 mg by mouth BID          Hypokalemia     2023 Potassium 2.8  Potassium 40 meq by mouth BID x 2 days  Then Potassium 20 meq by mouth daily          Iron deficiency anemia     Ferrous gluconate 324 mg by mouth daily           Vitamin D deficiency     D3 2000 units by mouth daily                HPI: Client was admitted at [facility] from 2023- 2023 with the final diagnosis of Exacerbation COPD, HF, MEERA, and anxiety. Client received bronchodilators and steroids. She is pending DC to home with [facility] services, end stage COPD. Client denies HA, dizziness, or lightheadedness. Denies CP, current SOB,  or  increased edema. 4 lit NC. Denies abdominal pain, or N/V. Denies dysuria. Denies current pain.  Client has chronic shoulder and back pain.     Review of Systems ROS as described in in HPI     Physical Exam  Constitutional:       Comments: Resting in bed    HENT:      Mouth/Throat:      Mouth: Mucous membranes are moist.   Cardiovascular:      Rate and Rhythm: Normal rate.   Pulmonary:      Effort: Pulmonary effort is normal.      Comments: 4 lit NC  Trilogy   Abdominal:      General: Bowel sounds are normal.   Musculoskeletal:      Cervical back: Neck supple.      Comments: WC transfers   No current leg edema    Skin:     General: Skin is warm and dry.   Neurological:      Mental Status: She is alert. Mental status is at baseline.   Psychiatric:         Mood and Affect: Mood normal.      Comments: Intermittent heightened anxiety                  Electronically Signed By: TERRY Rhodes   9/27/23  8:52 AM

## 2023-09-27 NOTE — ASSESSMENT & PLAN NOTE
9/25/2023 Potassium 2.8  Potassium 40 meq by mouth BID x 2 days  Then Potassium 20 meq by mouth daily

## 2023-09-27 NOTE — PROGRESS NOTES
Patient ID: Ruth Escobedo is a 62 y.o. female who is acute skilled care being seen and evaluated for multiple medical problems.  [id number]   1960    /74   Pulse 76   Temp 36.8 °C (98.3 °F)   Resp 16   Wt 84.8 kg (187 lb)   SpO2 96%   BMI 33.13 kg/m²     Assessment/Plan  Problem List Items Addressed This Visit       Anxiety syndrome     Hydroxyzine 25 mg by mouth BID as needed  Buspirone 15 mg by mouth TID         COPD (chronic obstructive pulmonary disease) (CMS/[facility])     O2 4 lit NC  Duoneb every 4 hrs and every two hrs as needed   Budesonide 0.5 ml inhalation BID   Acetylcysteine 10% aerosol QID  Trelegy 100-62.5- 25 mcg one inhalation daily   Proventil 90 mcg two inhalations every 4 hrs as needed   Roflumilast 500 mcg by mouth daily   Prednisone 20 mg by mouth daily   Azithromycin 500 mg by mouth every MWF  Trilogy          Depressive disorder     Lexapro 10 mg by mouth daily   Mirtazapine 15 mg by mouth daily          DM2 (diabetes mellitus, type 2) (CMS/[facility]) - Primary     Humalog 8 units subcutaneous TID with meals  Humalog SSC as prescribed            GERD (gastroesophageal reflux disease)     Protonix 40 mg by mouth daily              DVT (deep venous thrombosis) (CMS/[facility])     H/O LUE DVT 6/2023  Apixaban 5 mg by mouth BID          Hypokalemia     9/25/2023 Potassium 2.8  Potassium 40 meq by mouth BID x 2 days  Then Potassium 20 meq by mouth daily          Iron deficiency anemia     Ferrous gluconate 324 mg by mouth daily           Vitamin D deficiency     D3 2000 units by mouth daily                HPI: Client was admitted at [facility] from 9/18/2023- 9/22/2023 with the final diagnosis of Exacerbation COPD, HF, MEERA, and anxiety. Client received bronchodilators and steroids. She is pending DC to home with [facility] services, end stage COPD. Client denies HA, dizziness, or lightheadedness. Denies CP, current SOB,  or increased edema. 4 lit NC. Denies abdominal pain, or N/V. Denies dysuria.  Denies current pain.  Client has chronic shoulder and back pain.     Review of Systems ROS as described in in HPI     Physical Exam  Constitutional:       Comments: Resting in bed    HENT:      Mouth/Throat:      Mouth: Mucous membranes are moist.   Cardiovascular:      Rate and Rhythm: Normal rate.   Pulmonary:      Effort: Pulmonary effort is normal.      Comments: 4 lit NC  Trilogy   Abdominal:      General: Bowel sounds are normal.   Musculoskeletal:      Cervical back: Neck supple.      Comments: WC transfers   No current leg edema    Skin:     General: Skin is warm and dry.   Neurological:      Mental Status: She is alert. Mental status is at baseline.   Psychiatric:         Mood and Affect: Mood normal.      Comments: Intermittent heightened anxiety

## 2023-09-27 NOTE — ASSESSMENT & PLAN NOTE
O2 4 lit NC  Duoneb every 4 hrs and every two hrs as needed   Budesonide 0.5 ml inhalation BID   Acetylcysteine 10% aerosol QID  Trelegy 100-62.5- 25 mcg one inhalation daily   Proventil 90 mcg two inhalations every 4 hrs as needed   Roflumilast 500 mcg by mouth daily   Prednisone 20 mg by mouth daily   Azithromycin 500 mg by mouth every MWF  Trilogy

## 2023-10-04 DIAGNOSIS — F41.9 ANXIETY SYNDROME: ICD-10-CM

## 2023-10-04 RX ORDER — HYDROXYZINE HYDROCHLORIDE 25 MG/1
50 TABLET, FILM COATED ORAL EVERY 8 HOURS PRN
Qty: 30 TABLET | Refills: 1 | Status: SHIPPED | OUTPATIENT
Start: 2023-10-04 | End: 2024-06-06